# Patient Record
Sex: FEMALE | Race: WHITE | URBAN - METROPOLITAN AREA
[De-identification: names, ages, dates, MRNs, and addresses within clinical notes are randomized per-mention and may not be internally consistent; named-entity substitution may affect disease eponyms.]

---

## 2017-11-05 ENCOUNTER — EMERGENCY (EMERGENCY)
Facility: HOSPITAL | Age: 22
LOS: 0 days | Discharge: HOME | End: 2017-11-05
Attending: EMERGENCY MEDICINE | Admitting: EMERGENCY MEDICINE
Payer: COMMERCIAL

## 2017-11-05 VITALS — HEIGHT: 65 IN | WEIGHT: 134.92 LBS

## 2017-11-05 VITALS
DIASTOLIC BLOOD PRESSURE: 93 MMHG | RESPIRATION RATE: 18 BRPM | HEART RATE: 104 BPM | SYSTOLIC BLOOD PRESSURE: 136 MMHG | TEMPERATURE: 98 F | OXYGEN SATURATION: 98 %

## 2017-11-05 LAB
ANION GAP SERPL CALC-SCNC: 7 MMOL/L — SIGNIFICANT CHANGE UP (ref 5–17)
BASOPHILS # BLD AUTO: 0.1 K/UL — SIGNIFICANT CHANGE UP (ref 0–0.2)
BASOPHILS NFR BLD AUTO: 1.1 % — SIGNIFICANT CHANGE UP (ref 0–2)
BUN SERPL-MCNC: 13 MG/DL — SIGNIFICANT CHANGE UP (ref 7–23)
CALCIUM SERPL-MCNC: 8.8 MG/DL — SIGNIFICANT CHANGE UP (ref 8.5–10.1)
CHLORIDE SERPL-SCNC: 106 MMOL/L — SIGNIFICANT CHANGE UP (ref 96–108)
CO2 SERPL-SCNC: 25 MMOL/L — SIGNIFICANT CHANGE UP (ref 22–31)
CREAT SERPL-MCNC: 1.11 MG/DL — SIGNIFICANT CHANGE UP (ref 0.5–1.3)
EOSINOPHIL # BLD AUTO: 0.3 K/UL — SIGNIFICANT CHANGE UP (ref 0–0.5)
EOSINOPHIL NFR BLD AUTO: 3.2 % — SIGNIFICANT CHANGE UP (ref 0–6)
GLUCOSE SERPL-MCNC: 82 MG/DL — SIGNIFICANT CHANGE UP (ref 70–99)
HCT VFR BLD CALC: 41.1 % — SIGNIFICANT CHANGE UP (ref 34.5–45)
HGB BLD-MCNC: 13.7 G/DL — SIGNIFICANT CHANGE UP (ref 11.5–15.5)
LYMPHOCYTES # BLD AUTO: 3.1 K/UL — SIGNIFICANT CHANGE UP (ref 1–3.3)
LYMPHOCYTES # BLD AUTO: 39.6 % — SIGNIFICANT CHANGE UP (ref 13–44)
MCHC RBC-ENTMCNC: 30.2 PG — SIGNIFICANT CHANGE UP (ref 27–34)
MCHC RBC-ENTMCNC: 33.4 GM/DL — SIGNIFICANT CHANGE UP (ref 32–36)
MCV RBC AUTO: 90.6 FL — SIGNIFICANT CHANGE UP (ref 80–100)
MONOCYTES # BLD AUTO: 0.7 K/UL — SIGNIFICANT CHANGE UP (ref 0–0.9)
MONOCYTES NFR BLD AUTO: 9.3 % — SIGNIFICANT CHANGE UP (ref 2–14)
NEUTROPHILS # BLD AUTO: 3.7 K/UL — SIGNIFICANT CHANGE UP (ref 1.8–7.4)
NEUTROPHILS NFR BLD AUTO: 46.8 % — SIGNIFICANT CHANGE UP (ref 43–77)
PLATELET # BLD AUTO: 316 K/UL — SIGNIFICANT CHANGE UP (ref 150–400)
POTASSIUM SERPL-MCNC: 4.1 MMOL/L — SIGNIFICANT CHANGE UP (ref 3.5–5.3)
POTASSIUM SERPL-SCNC: 4.1 MMOL/L — SIGNIFICANT CHANGE UP (ref 3.5–5.3)
RBC # BLD: 4.54 M/UL — SIGNIFICANT CHANGE UP (ref 3.8–5.2)
RBC # FLD: 12.6 % — SIGNIFICANT CHANGE UP (ref 10.3–14.5)
SODIUM SERPL-SCNC: 138 MMOL/L — SIGNIFICANT CHANGE UP (ref 135–145)
WBC # BLD: 7.9 K/UL — SIGNIFICANT CHANGE UP (ref 3.8–10.5)
WBC # FLD AUTO: 7.9 K/UL — SIGNIFICANT CHANGE UP (ref 3.8–10.5)

## 2017-11-05 PROCEDURE — 99284 EMERGENCY DEPT VISIT MOD MDM: CPT

## 2017-11-05 PROCEDURE — 93010 ELECTROCARDIOGRAM REPORT: CPT

## 2017-11-05 RX ORDER — KETOROLAC TROMETHAMINE 30 MG/ML
30 SYRINGE (ML) INJECTION ONCE
Qty: 0 | Refills: 0 | Status: DISCONTINUED | OUTPATIENT
Start: 2017-11-05 | End: 2017-11-05

## 2017-11-05 RX ORDER — AZTREONAM 2 G
1 VIAL (EA) INJECTION
Qty: 14 | Refills: 0 | OUTPATIENT
Start: 2017-11-05 | End: 2017-11-12

## 2017-11-05 RX ORDER — SODIUM CHLORIDE 9 MG/ML
1000 INJECTION INTRAMUSCULAR; INTRAVENOUS; SUBCUTANEOUS ONCE
Qty: 0 | Refills: 0 | Status: COMPLETED | OUTPATIENT
Start: 2017-11-05 | End: 2017-11-05

## 2017-11-05 RX ADMIN — SODIUM CHLORIDE 1000 MILLILITER(S): 9 INJECTION INTRAMUSCULAR; INTRAVENOUS; SUBCUTANEOUS at 21:09

## 2017-11-05 RX ADMIN — Medication 30 MILLIGRAM(S): at 21:09

## 2017-11-05 RX ADMIN — Medication 1 TABLET(S): at 21:09

## 2017-11-05 NOTE — ED STATDOCS - [facility] ED SYMPTOM NEG
no diarrhea/no vomiting/no palpitations/no blood in stool/no dysuria/no abdominal distention/no hematuria/no fever/no burning urination/no nausea

## 2017-11-05 NOTE — ED STATDOCS - OBJECTIVE STATEMENT
21 y/o F PMHx MRSA, presents to the ED c/o abscess. The pt provides that she started having an abscess over her right under arm and was prescribe with minocycline on Friday. The pt notes that the abscess has not decreased in size, may have started after shaving that area. Pt felt dizzy today. No h/o fever, chills, abd pain, nvd, cp, cough, sob, rash or urinary incontinence.

## 2017-11-05 NOTE — ED STATDOCS - PROGRESS NOTE DETAILS
22 yr. old female PMH: MRSA presents to Ed with abscess under right arm. Seen by [facility] at school and Rx. minocycline on friday.   No fever or chills. Seen and examined by attending in Intake. Plan: IV, Labs, Urine  preg. Rx. Bactrim   Will F/U with results and re evaluate. Fernando HERNADEZ

## 2017-11-05 NOTE — ED STATDOCS - ATTENDING CONTRIBUTION TO CARE
Attending Contribution to Care: I, Skylar Funes, performed the initial face to face bedside interview with this patient regarding history of present illness, review of symptoms and relevant past medical, social and family history.  I completed an independent physical examination.  I was the initial provider who evaluated this patient. I have signed out the follow up of any pending tests (i.e. labs, radiological studies) to the ACP.  I have communicated the patient’s plan of care and disposition with the ACP.

## 2017-11-05 NOTE — ED STATDOCS - SKIN, MLM
1cm healed lesion, over right axilla, no fluctuance, no granulation tissue, no erythema, no drainage.

## 2017-11-10 DIAGNOSIS — L03.111 CELLULITIS OF RIGHT AXILLA: ICD-10-CM

## 2024-04-04 ENCOUNTER — ASOB RESULT (OUTPATIENT)
Age: 29
End: 2024-04-04

## 2024-04-04 ENCOUNTER — APPOINTMENT (OUTPATIENT)
Dept: ANTEPARTUM | Facility: CLINIC | Age: 29
End: 2024-04-04
Payer: COMMERCIAL

## 2024-04-04 PROCEDURE — 76856 US EXAM PELVIC COMPLETE: CPT | Mod: 59

## 2024-08-07 ENCOUNTER — APPOINTMENT (OUTPATIENT)
Dept: ORTHOPEDIC SURGERY | Facility: CLINIC | Age: 29
End: 2024-08-07

## 2024-08-07 PROCEDURE — 71120 X-RAY EXAM BREASTBONE 2/>VWS: CPT

## 2024-08-07 PROCEDURE — 99203 OFFICE O/P NEW LOW 30 MIN: CPT

## 2024-08-08 NOTE — IMAGING
[de-identified] : Right anterior chest wall/costal sternal tenderness  No obvious chest wall deformity Mildly TTP along the pec muscle origin  Neck:  no rash or ecchymosis  no spasm in traps, rhomboids, paracervicals  full ROM 5/5 bilateral deltoid, biceps, triceps, wrist flexors, wrist extensors - Michelle's bilaterally   Shoulder (R / L) Normal muscle tone/ bulk  / 170 Abd 80 / 80 ER at side 60 / 60 IR T10 / T10 SILT throughout  Multiple views of the sternum: No acute fractures or malalignment

## 2024-08-08 NOTE — DISCUSSION/SUMMARY
[de-identified] : - discussed the etiology/ pathophysiology of the patient's complaints today in layman's terms - discussed conservative treatment options including the role for anti-inflammatory medications, injections, bracing and therapy - reviewed r/b/a to these - activity modifications - recommend rheumatologic evaluation - NSAIDs prn pain - f/u prn

## 2024-08-08 NOTE — HISTORY OF PRESENT ILLNESS
[de-identified] : 08/07/2024 LEXIE SALAZAR is a 29-year-old female here today for: Location: right sided sternum  Complaint: The patient presents to the office for evaluation of right chest wall pain.  She notes a history of costochondritis with acute worsening of anterior right-sided chest wall pain beginning suddenly roughly 3 days ago.  She notes that she was rising from bed awkwardly when she noted the sudden onset of pain along her right sternum.  She had difficulty with any chest wall movement including laughing and coughing.  She was seen at the urgent care where she was given Mobic with instructions to follow-up with a specialist.  Today the patient notes near complete resolution of her previous pain but inquires about her prognosis.  She has never had a rheumatologic evaluation.  No history of recent illnesses although she was recently diagnosed with type 1 diabetes. Symptom onset: 8/4/24 Prior treatments: NSAIDs Hand Dominance: right  Occupation:   PMH: diabetes (type I recently diagnosed) Allergies: none

## 2024-08-08 NOTE — DISCUSSION/SUMMARY
[de-identified] : - discussed the etiology/ pathophysiology of the patient's complaints today in layman's terms - discussed conservative treatment options including the role for anti-inflammatory medications, injections, bracing and therapy - reviewed r/b/a to these - activity modifications - recommend rheumatologic evaluation - NSAIDs prn pain - f/u prn

## 2024-08-08 NOTE — HISTORY OF PRESENT ILLNESS
[de-identified] : 08/07/2024 LEXIE SALAZAR is a 29-year-old female here today for: Location: right sided sternum  Complaint: The patient presents to the office for evaluation of right chest wall pain.  She notes a history of costochondritis with acute worsening of anterior right-sided chest wall pain beginning suddenly roughly 3 days ago.  She notes that she was rising from bed awkwardly when she noted the sudden onset of pain along her right sternum.  She had difficulty with any chest wall movement including laughing and coughing.  She was seen at the urgent care where she was given Mobic with instructions to follow-up with a specialist.  Today the patient notes near complete resolution of her previous pain but inquires about her prognosis.  She has never had a rheumatologic evaluation.  No history of recent illnesses although she was recently diagnosed with type 1 diabetes. Symptom onset: 8/4/24 Prior treatments: NSAIDs Hand Dominance: right  Occupation:   PMH: diabetes (type I recently diagnosed) Allergies: none

## 2024-08-08 NOTE — IMAGING
[de-identified] : Right anterior chest wall/costal sternal tenderness  No obvious chest wall deformity Mildly TTP along the pec muscle origin  Neck:  no rash or ecchymosis  no spasm in traps, rhomboids, paracervicals  full ROM 5/5 bilateral deltoid, biceps, triceps, wrist flexors, wrist extensors - Michelle's bilaterally   Shoulder (R / L) Normal muscle tone/ bulk  / 170 Abd 80 / 80 ER at side 60 / 60 IR T10 / T10 SILT throughout  Multiple views of the sternum: No acute fractures or malalignment